# Patient Record
Sex: MALE | Race: OTHER
[De-identification: names, ages, dates, MRNs, and addresses within clinical notes are randomized per-mention and may not be internally consistent; named-entity substitution may affect disease eponyms.]

---

## 2017-10-27 ENCOUNTER — HOSPITAL ENCOUNTER (EMERGENCY)
Dept: HOSPITAL 74 - JER | Age: 52
Discharge: HOME | End: 2017-10-27
Payer: COMMERCIAL

## 2017-10-27 VITALS — DIASTOLIC BLOOD PRESSURE: 95 MMHG | HEART RATE: 73 BPM | SYSTOLIC BLOOD PRESSURE: 148 MMHG | TEMPERATURE: 98.1 F

## 2017-10-27 VITALS — BODY MASS INDEX: 37.8 KG/M2

## 2017-10-27 DIAGNOSIS — E11.9: ICD-10-CM

## 2017-10-27 DIAGNOSIS — J02.0: Primary | ICD-10-CM

## 2017-10-27 DIAGNOSIS — B95.0: ICD-10-CM

## 2017-10-27 DIAGNOSIS — Z79.84: ICD-10-CM

## 2017-10-27 NOTE — PDOC
History of Present Illness





- General


Chief Complaint: Sore Throat


Stated Complaint: THROAT PAIN


Time Seen by Provider: 10/27/17 08:03


History Source: Patient


Exam Limitations: No Limitations





- History of Present Illness


Initial Comments: 





10/27/17 08:15


My chief complaint: Sore throat








History of present illness: Patient is a 52-year-old male with a history of non-

insulin-dependent diabetes here today complaining of a sore throat since last 

night. Patient denies any fever. Patient denies any difficulty swallowing 

except for soreness of throat, is not drooling. Patient denies any difficulty 

breathing. Patient denies any recent sick contacts. Patient denies nasal 

congestion, cough, nausea, vomiting or diarrhea. Patient denies any recent 

travel.


Timing/Duration: getting worse


Severity: mild


Associated Symptoms: reports: other (sore throat )





Past History





- Past Medical History


Allergies/Adverse Reactions: 


 Allergies











Allergy/AdvReac Type Severity Reaction Status Date / Time


 


No Known Allergies Allergy   Verified 10/27/17 07:23











Home Medications: 


Ambulatory Orders





Metformin HCl 500 mg PO BID 10/27/17 








Diabetes: Yes





- Suicide/Smoking/Psychosocial Hx


Smoking History: Former smoker


Have you smoked in the past 12 months: No


If you are a former smoker, when did you quit?: 15 yrs


Information on smoking cessation initiated: No


Hx Alcohol Use: Yes (socially)


Drug/Substance Use Hx: No


Substance Use Type: None





**Review of Systems





- Review of Systems


Able to Perform ROS?: Yes


Constitutional: No: Symptoms Reported


HEENTM: Yes: Throat Pain


Respiratory: No: Symptoms reported


Cardiac (ROS): No: Symptoms Reported


ABD/GI: No: Symptoms Reported


: No: Symptoms Reported


Musculoskeletal: No: Symptoms Reported


Integumentary: No: Symptoms Reported


Neurological: No: Symptoms reported





*Physical Exam





- Vital Signs


 Last Vital Signs











Temp Pulse Resp BP Pulse Ox


 


 98.1 F   73   18   148/95   100 


 


 10/27/17 07:23  10/27/17 07:23  10/27/17 07:23  10/27/17 07:23  10/27/17 07:23














- Physical Exam


General Appearance: Yes: Appropriately Dressed


HEENT: positive: TMs Normal, Pharyngeal Erythema, Tonsillar Erythema (with no 

tonsillar deviation), Other (no trismus ).  negative: Tonsillar Exudate


Neck: positive: Lymphadenopathy (L).  negative: Lymphadenopathy (R)


Respiratory/Chest: positive: Lungs Clear, Normal Breath Sounds.  negative: 

Chest Tender, Respiratory Distress


Cardiovascular: positive: Regular Rhythm, Regular Rate, S1, S2


Integumentary: positive: Normal Color


Neurologic: positive: Alert, Responsive





Medical Decision Making





- Medical Decision Making


10/27/17 08:16


Patient is a 52-year-old male with a history of non-insulin-dependent diabetes 

here today complaining of a sore throat since last night. Patient denies any 

fever. Patient denies any difficulty swallowing except for soreness of throat, 

is not drooling. Patient denies any difficulty breathing. Patient denies any 

recent sick contacts. Patient denies nasal congestion, cough, nausea, vomiting 

or diarrhea. Patient denies any recent travel.














pharyngitis/tonsillitis r/o Strep throat











PLAN: 





throat C A& S rapid + for Beta Hemolytic strep gRoup A


benzathine penicillin G 1.2 IM now 





10/27/17 09:03








10/27/17 09:06








*DC/Admit/Observation/Transfer


Diagnosis at time of Disposition: 


 Strep tonsillitis





- Discharge Dispostion


Disposition: HOME


Condition at time of disposition: Stable





- Patient Instructions


Additional Instructions: 











Drink a lot a fluids and rest











May use Cepacol throat lozenges or to help decrease discomfort of throat and 

ibuprofen as needed as directed by  for pain/fever














Return to emergency room if symptoms worsen or new symptoms develop any 

difficulty swallowing











Today your throat culture was positive for strep throat

















Patient voiced understanding of discharge instructions and all questions were 

answered








Thank You for choosing [facility] emergency room for your medical care 

today





- Post Discharge Activity


Forms/Work/School Notes:  Back to Work

## 2017-10-28 ENCOUNTER — HOSPITAL ENCOUNTER (EMERGENCY)
Dept: HOSPITAL 74 - JER | Age: 52
Discharge: HOME | End: 2017-10-28
Payer: COMMERCIAL

## 2017-10-28 VITALS — HEART RATE: 122 BPM | TEMPERATURE: 99.5 F | DIASTOLIC BLOOD PRESSURE: 82 MMHG | SYSTOLIC BLOOD PRESSURE: 139 MMHG

## 2017-10-28 VITALS — BODY MASS INDEX: 37.8 KG/M2

## 2017-10-28 DIAGNOSIS — B95.4: ICD-10-CM

## 2017-10-28 DIAGNOSIS — J03.00: Primary | ICD-10-CM

## 2017-10-28 PROCEDURE — 3E0233Z INTRODUCTION OF ANTI-INFLAMMATORY INTO MUSCLE, PERCUTANEOUS APPROACH: ICD-10-PCS

## 2017-10-28 NOTE — PDOC
History of Present Illness





- General


Chief Complaint: Sore Throat


Stated Complaint: DIFFICULTY BREATHING


Time Seen by Provider: 10/28/17 00:13


History Source: Patient


Exam Limitations: No Limitations





- History of Present Illness


Initial Comments: 





10/28/17 00:28


Pt. is a 53 y/o male w/ PMH of NIDDM who presents to the ED tonight c/o a 

choking sensation. Pt. was seen in our ED yesterday morning (10/27/17) c/o sore 

throat. He was found to have strep tonsilitis. Pt. was given a bicillin shot in 

the department and sent home with instructions to use throat lozenges and 

ibuprofen. Pt states that his pain has not gotten any better since the shot. He 

states he took 200mg of ibuprofen at 21:30 on 10/27/17. States that he now 

feels feverish. Denies chills, nausea, vomiting, cough, rhinorrhea, nausea, 

vomiting, diarrhea.  





Past History





- Travel


Traveled outside of the country in the last 30 days: No


Close contact w/someone who was outside of country & ill: No





- Past Medical History


Allergies/Adverse Reactions: 


 Allergies











Allergy/AdvReac Type Severity Reaction Status Date / Time


 


No Known Allergies Allergy   Verified 10/28/17 00:13











Home Medications: 


Ambulatory Orders





Metformin HCl 500 mg PO BID 10/27/17 


Ibuprofen 800 mg PO TID #21 tablet 10/28/17 


Prednisone [Deltasone -] 40 mg PO DAILY #9 tablet 10/28/17 








Diabetes: Yes





- Suicide/Smoking/Psychosocial Hx


Smoking History: Never smoked


Have you smoked in the past 12 months: No


If you are a former smoker, when did you quit?: 15 yrs


Information on smoking cessation initiated: No


Hx Alcohol Use: Yes (socially)


Drug/Substance Use Hx: No


Substance Use Type: None





**Review of Systems





- Review of Systems


Able to Perform ROS?: Yes


Comments:: 


10/28/17 00:31


CONSTITUTIONAL: 


Present: Subjective fevers. Absent: chills, diaphoresis, generalized weakness, 

malaise, loss of appetite


HEENT: 


Present: Sore throat, difficulty swallowing, choking sensation. Absent: 

rhinorrhea, nasal congestion, throat swelling, mouth swelling, ear pain, eye 

pain, visual changes


CARDIOVASCULAR: 


Absent: chest pain, loss of consciousness, palpitations, irregular heart rate, 

peripheral edema


RESPIRATORY: 


Absent: cough, shortness of breath, dyspnea with exertion, orthopnea, wheezing, 

stridor, hemoptysis


GASTROINTESTINAL:


Absent: abdominal pain, abdominal distension, nausea, vomiting, diarrhea, 

constipation, melena, hematochezia


GENITOURINARY: 


Absent: dysuria, frequency, urgency, hesitancy, hematuria, flank pain, genital 

pain


MUSCULOSKELETAL: 


Absent: myalgia, arthralgia, joint swelling


SKIN: 


Absent: rash, itching, pallor


HEMATOLOGIC/IMMUNOLOGIC: 


Absent: easy bleeding, easy bruising, lymphadenopathy, frequent infections


ENDOCRINE:


Absent: unexplained weight gain, unexplained weight loss, heat intolerance, 

cold intolerance


NEUROLOGIC: 


Absent: headache, focal weakness or paresthesias, dizziness, unsteady gait, 

seizure, mental status changes, bladder or bowel incontinence


PSYCHIATRIC: 


Absent: anxiety, depression, suicidal or homicidal ideation, hallucinations.


Is the patient limited English proficient: No





*Physical Exam





- Vital Signs


 Last Vital Signs











Temp Pulse Resp BP Pulse Ox


 


 99.5 F   122 H  20   139/82   98 


 


 10/28/17 00:11  10/28/17 00:11  10/28/17 00:11  10/28/17 00:11  10/28/17 00:11














- Physical Exam


Comments: 





10/28/17 00:32


GENERAL:


Well developed, well nourished. Awake and alert. No acute distress.


HEENT:


Normocephalic, atraumatic. PERRLA, EOMI. No conjunctival pallor. Sclera are non-

icteric. Moist mucous membranes. 2+ tonsils with no exudate. No tonsillar 

deviation. 


NECK: 


Supple. Full ROM. No JVD. Carotid pulses 2+ and symmetric, without bruits. No 

thyromegaly. (+) L lymphadenopathy, (-) LAD on the right.


CARDIOVASCULAR:


Tachycardic. Regular rhythm. No murmurs, rubs, or gallops. Distal pulses are 2+ 

and symmetric. 


PULMONARY: 


No evidence of respiratory distress. Lungs clear to auscultation bilaterally. 

No wheezing, rales or rhonchi.


ABDOMINAL:


Soft. Non-tender. Non-distended. No rebound or guarding. No organomegaly. 

Normoactive bowel sounds. 


MUSCULOSKELETAL 


Normal range of motion at all joints. No bony deformities or tenderness. No CVA 

tenderness.


EXTREMITIES: 


No cyanosis. No clubbing. No edema. No calf tenderness.


SKIN: 


Warm and dry. Normal capillary refill. No rashes. No jaundice. 


NEUROLOGICAL: 


Alert, awake, appropriate. Cranial nerves 2-12 intact. No deficits to light 

touch and temperature in face, upper extremities and lower extremities. No 

motor deficits in the in face, upper extremities and lower extremities. 

Normoreflexic in the upper and lower extremities. Normal speech. Toes are down-

going bilaterally. Gait is normal without ataxia.


PSYCHIATRIC: 


Cooperative. Good eye contact. Appropriate mood and affect.





Medical Decision Making





- Medical Decision Making





10/28/17 01:21


Pt. is a 53 y/o male with PHM of HTN who presents to the ED with sore throat. 

Pt. was seen earlier today by our staff and diagnosed with strep throat. He was 

given a shot of bicillin and discharged. Explained to pt that he will not feel 

better right away and gave more ibuprofen. Also treated with prednisone for the 

swelling. Given instructions for supportive care. Pt. understands  all 

discharge instructions and all questions were answered. Pt. given strict return 

precautions.





*DC/Admit/Observation/Transfer


Diagnosis at time of Disposition: 


 Strep tonsillitis





- Discharge Dispostion


Disposition: HOME


Condition at time of disposition: Good


Admit: No





- Prescriptions


Prescriptions: 


Prednisone [Deltasone -] 40 mg PO DAILY #9 tablet


Ibuprofen 800 mg PO TID #21 tablet





- Referrals


Referrals: 


Tremayne Jerry MD [Staff Physician] - 





- Patient Instructions


Printed Discharge Instructions:  DI for Strep Throat


Additional Instructions: 


You have strep throat. You were given an antibiotic shot earlier today. This 

shot takes approximately 24 hours to start working. Take 800mg ibuprofen every 

8 hours as needed for pain. You were also prescribed steroids. Take the 

medication as prescribed. Continue to use the throat lozenges to help with your 

symptoms. Salt water gargles and hot tea may help to reduce your symptoms. 

Sleep propped up on a few pillows until your symptoms resolve to help with your 

breathing at night time. You may follow up with the ENT  (ears nose throat) 

doctor if you do not feel better in the next few days. Attached is a referral. 





Return to the ED if your pain is not getting better in 2-3 days, if you develop 

weakness, fatigue, increased difficulty swallowing, or have any changes in your 

symptoms. 








Usted tiene amigdalitis estreptoccica. Le dieron un antibitico inyectado el d

a de hoy. Esta shea dura aproximadamente 24 horas para comenzar a funcionar. 

[place] 800 mg de ibuprofeno cada 8 horas segn sea necesario para el dolor. Tambi

n se te recetaron esteroides. [place] la medicacin segn lo prescrito. Contine 

usando las pastillas para la garganta para ayudar con osiel sntomas. Grgaras de 

agua salada y t caliente pueden ayudar a reducir osiel sntomas. Duerma apoyado 

sobre unas almohadas hasta que osiel sntomas se resuelvan para ayudar con abarca 

respiracin cole la noche. Puede hacer un seguimiento con el otorrinolaring

logo (ORL) si no se siente mejor en los prximos negron. Adjunto hay angel 

referencia.





Regrese al departamento de emergencias si abarca dolor no mejora en 2 a 3 negron, si 

desarrolla debilidad, fatiga, dificultad para tragar o cambios en los sntomas.


Print Language: Kinyarwanda

## 2022-01-10 ENCOUNTER — HOSPITAL ENCOUNTER (EMERGENCY)
Dept: HOSPITAL 74 - JER | Age: 57
LOS: 1 days | Discharge: HOME | End: 2022-01-11
Payer: COMMERCIAL

## 2022-01-10 VITALS — BODY MASS INDEX: 34.9 KG/M2

## 2022-01-10 VITALS — HEART RATE: 81 BPM | SYSTOLIC BLOOD PRESSURE: 114 MMHG | DIASTOLIC BLOOD PRESSURE: 66 MMHG | TEMPERATURE: 99.8 F

## 2022-01-10 DIAGNOSIS — R05.1: Primary | ICD-10-CM

## 2022-01-10 PROCEDURE — U0005 INFEC AGEN DETEC AMPLI PROBE: HCPCS

## 2022-01-10 PROCEDURE — U0003 INFECTIOUS AGENT DETECTION BY NUCLEIC ACID (DNA OR RNA); SEVERE ACUTE RESPIRATORY SYNDROME CORONAVIRUS 2 (SARS-COV-2) (CORONAVIRUS DISEASE [COVID-19]), AMPLIFIED PROBE TECHNIQUE, MAKING USE OF HIGH THROUGHPUT TECHNOLOGIES AS DESCRIBED BY CMS-2020-01-R: HCPCS

## 2022-01-11 LAB
ALBUMIN SERPL-MCNC: 3.1 G/DL (ref 3.4–5)
ALP SERPL-CCNC: 83 U/L (ref 45–117)
ALT SERPL-CCNC: 31 U/L (ref 13–61)
ANION GAP SERPL CALC-SCNC: 7 MMOL/L (ref 8–16)
APTT BLD: 37.4 SECONDS (ref 25.2–36.5)
AST SERPL-CCNC: 32 U/L (ref 15–37)
BASOPHILS # BLD: 0.2 % (ref 0–2)
BILIRUB SERPL-MCNC: 0.3 MG/DL (ref 0.2–1)
BUN SERPL-MCNC: 15.1 MG/DL (ref 7–18)
CALCIUM SERPL-MCNC: 8.2 MG/DL (ref 8.5–10.1)
CHLORIDE SERPL-SCNC: 104 MMOL/L (ref 98–107)
CO2 SERPL-SCNC: 26 MMOL/L (ref 21–32)
CREAT SERPL-MCNC: 0.7 MG/DL (ref 0.55–1.3)
DEPRECATED RDW RBC AUTO: 13.1 % (ref 11.9–15.9)
EOSINOPHIL # BLD: 0.2 % (ref 0–4.5)
GLUCOSE SERPL-MCNC: 153 MG/DL (ref 74–106)
HCT VFR BLD CALC: 36.5 % (ref 35.4–49)
HGB BLD-MCNC: 12.8 GM/DL (ref 11.7–16.9)
INR BLD: 1.06 (ref 0.83–1.09)
LYMPHOCYTES # BLD: 18.8 % (ref 8–40)
MAGNESIUM SERPL-MCNC: 2.3 MG/DL (ref 1.8–2.4)
MCH RBC QN AUTO: 29.7 PG (ref 25.7–33.7)
MCHC RBC AUTO-ENTMCNC: 35 G/DL (ref 32–35.9)
MCV RBC: 85 FL (ref 80–96)
MONOCYTES # BLD AUTO: 7.1 % (ref 3.8–10.2)
NEUTROPHILS # BLD: 73.7 % (ref 42.8–82.8)
PLATELET # BLD AUTO: 194 10^3/UL (ref 134–434)
PMV BLD: 7.5 FL (ref 7.5–11.1)
PROT SERPL-MCNC: 6.9 G/DL (ref 6.4–8.2)
PT PNL PPP: 12.2 SEC (ref 9.7–13)
RBC # BLD AUTO: 4.3 M/MM3 (ref 4–5.6)
SODIUM SERPL-SCNC: 136 MMOL/L (ref 136–145)
WBC # BLD AUTO: 5.3 K/MM3 (ref 4–10)

## 2022-01-13 ENCOUNTER — HOSPITAL ENCOUNTER (EMERGENCY)
Dept: HOSPITAL 74 - JER | Age: 57
Discharge: HOME | End: 2022-01-13
Payer: COMMERCIAL

## 2022-01-13 VITALS — BODY MASS INDEX: 34 KG/M2

## 2022-01-13 VITALS — TEMPERATURE: 98.3 F

## 2022-01-13 VITALS — DIASTOLIC BLOOD PRESSURE: 59 MMHG | HEART RATE: 68 BPM | SYSTOLIC BLOOD PRESSURE: 101 MMHG

## 2022-01-13 DIAGNOSIS — R06.02: Primary | ICD-10-CM

## 2022-01-13 LAB
ALBUMIN SERPL-MCNC: 2.8 G/DL (ref 3.4–5)
ALP SERPL-CCNC: 82 U/L (ref 45–117)
ALT SERPL-CCNC: 40 U/L (ref 13–61)
ANION GAP SERPL CALC-SCNC: 6 MMOL/L (ref 8–16)
AST SERPL-CCNC: 30 U/L (ref 15–37)
BASOPHILS # BLD: 0.5 % (ref 0–2)
BILIRUB SERPL-MCNC: 0.4 MG/DL (ref 0.2–1)
BUN SERPL-MCNC: 9.5 MG/DL (ref 7–18)
CALCIUM SERPL-MCNC: 8.6 MG/DL (ref 8.5–10.1)
CHLORIDE SERPL-SCNC: 105 MMOL/L (ref 98–107)
CO2 SERPL-SCNC: 28 MMOL/L (ref 21–32)
CREAT SERPL-MCNC: 0.5 MG/DL (ref 0.55–1.3)
DEPRECATED RDW RBC AUTO: 13 % (ref 11.9–15.9)
EOSINOPHIL # BLD: 1.2 % (ref 0–4.5)
GLUCOSE SERPL-MCNC: 119 MG/DL (ref 74–106)
HCT VFR BLD CALC: 34.1 % (ref 35.4–49)
HGB BLD-MCNC: 12.2 GM/DL (ref 11.7–16.9)
LYMPHOCYTES # BLD: 28 % (ref 8–40)
MCH RBC QN AUTO: 30.2 PG (ref 25.7–33.7)
MCHC RBC AUTO-ENTMCNC: 35.7 G/DL (ref 32–35.9)
MCV RBC: 84.5 FL (ref 80–96)
MONOCYTES # BLD AUTO: 12.2 % (ref 3.8–10.2)
NEUTROPHILS # BLD: 58.1 % (ref 42.8–82.8)
PLATELET # BLD AUTO: 305 10^3/UL (ref 134–434)
PMV BLD: 7.4 FL (ref 7.5–11.1)
PROT SERPL-MCNC: 6.7 G/DL (ref 6.4–8.2)
RBC # BLD AUTO: 4.04 M/MM3 (ref 4–5.6)
SODIUM SERPL-SCNC: 139 MMOL/L (ref 136–145)
WBC # BLD AUTO: 4.9 K/MM3 (ref 4–10)

## 2022-01-13 PROCEDURE — 3E0F7GC INTRODUCTION OF OTHER THERAPEUTIC SUBSTANCE INTO RESPIRATORY TRACT, VIA NATURAL OR ARTIFICIAL OPENING: ICD-10-PCS
